# Patient Record
Sex: MALE | Race: WHITE
[De-identification: names, ages, dates, MRNs, and addresses within clinical notes are randomized per-mention and may not be internally consistent; named-entity substitution may affect disease eponyms.]

---

## 2021-11-02 ENCOUNTER — HOSPITAL ENCOUNTER (EMERGENCY)
Dept: HOSPITAL 33 - ED | Age: 56
Discharge: HOME | End: 2021-11-02
Payer: COMMERCIAL

## 2021-11-02 VITALS — OXYGEN SATURATION: 97 % | HEART RATE: 79 BPM | DIASTOLIC BLOOD PRESSURE: 82 MMHG | SYSTOLIC BLOOD PRESSURE: 131 MMHG

## 2021-11-02 DIAGNOSIS — S61.411A: Primary | ICD-10-CM
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
## 2021-11-02 PROCEDURE — 90471 IMMUNIZATION ADMIN: CPT

## 2021-11-02 PROCEDURE — 90715 TDAP VACCINE 7 YRS/> IM: CPT

## 2021-11-02 PROCEDURE — 12001 RPR S/N/AX/GEN/TRNK 2.5CM/<: CPT

## 2021-11-02 PROCEDURE — 99283 EMERGENCY DEPT VISIT LOW MDM: CPT

## 2021-11-02 NOTE — ERPHSYRPT
- History of Present Illness


Time Seen by Provider: 11/02/21 12:52


Source: patient


Exam Limitations: no limitations


Physician History: 





This is a right-handed white male patient of Dr. Ronaldo Sousa who was working

on car vehicle when he suffered a superficial laceration to the dorsal aspect of

his right hand.  Patient's tetanus status is not up-to-date.


Occurred: just prior to arrival


Method of Injury: other


Severity of Pain-Max: none


Severity of Pain-Current: none


Extremities Pain Location: hand: right (Dorsal aspect)


Modifying Factors: Improves With: nothing


Associated Symptoms: none


Allergies/Adverse Reactions: 








No Known Drug Allergies Allergy (Verified 11/02/21 12:59)


   





Home Medications: 








Fluoxetine HCl [Fluoxetine Dr] 20 mg PO DAILY 11/02/21 [History]


LORazepam [Lorazepam] 2 mg PO DAILY 11/02/21 [History]


Mirtazapine 30 mg*** [Remeron 30 mg***] 30 mg PO DAILY 11/02/21 [History]


Vilazodone HCl [Viibryd] 1 each PO DAILY 11/02/21 [History]








Travel Risk





- International Travel


Have you traveled outside of the country in past 3 weeks: No





- Coronavirus Screening


Are you exhibiting any of the following symptoms?: No


Close contact with a COVID-19 positive Pt in past 14-21 Days: No





- Review of Systems


Constitutional: No Symptoms


Eyes: No Symptoms


Ears, Nose, & Throat: No Symptoms


Respiratory: No Symptoms


Cardiac: No Symptoms


Abdominal/Gastrointestinal: No Symptoms


Genitourinary Symptoms: No Symptoms


Musculoskeletal: Injury (Dorsal aspect right hand)


Skin: Other (Superficial stellate laceration dorsal aspect right hand)


Neurological: No Symptoms


Psychological: No Symptoms


Endocrine: No Symptoms


Hematologic/Lymphatic: No Symptoms


Immunological/Allergic: No Symptoms





- Past Medical History


Pertinent Past Medical History: Yes





- Past Surgical History


Past Surgical History: Yes





- Nursing Vital Signs


Nursing Vital Signs: 


                               Initial Vital Signs











Temperature  97.8 F   11/02/21 12:52


 


Pulse Rate  79   11/02/21 12:52


 


Respiratory Rate  18   11/02/21 12:52


 


Blood Pressure  131/82   11/02/21 12:52


 


O2 Sat by Pulse Oximetry  97   11/02/21 12:52








                                   Pain Scale











Pain Intensity                 0

















- Physical Exam


General Appearance: no apparent distress, alert


Eyes, Ears, Nose, Throat Exam: normal ENT inspection, moist mucous membranes


Neck Exam: normal inspection, non-tender, supple, full range of motion


Cardiovascular/Respiratory Exam: chest non-tender, no respiratory distress


Abdominal Exam: non-tender


Back Exam: normal inspection, normal range of motion, No CVA tenderness, No 

vertebral tenderness


Shoulder Exam: normal inspection, non-tender, no evidence of injury, normal ROM


Elbow/Forearm Exam: normal inspection, non-tender, no evidence of injury, normal

 ROM


Wrist Exam: normal inspection, non-tender, no evidence of injury, normal ROM


Hand Exam: non-tender, normal ROM, laceration (To centimeter stellate, 

superficial laceration dorsal aspect right hand.  No active bleeding.)


Neuro/Tendon Exam: normal sensation, normal motor functions, normal tendon 

functions, no evidence tendon injury


Mental Status Exam: alert, oriented x 3, cooperative


Skin Exam: laceration (Laceration dorsal aspect right hand.  See above)


**SpO2 Interpretation**: normal


O2 Delivery: Room Air





Procedures





- Laceration/Wound Repair


  ** Right Dorsal Hand


Time of Procedure: 13:15


Wound Location: Right, hand (Dorsal aspect)


Wound Length (cm): 2


Wound's Depth, Shape: superficial, stellate


Wound Explored: clean (In bloodless field to the base no foreign body noted)


Irrigated: Yes


Hibiclens Prep: Yes


Wound Repaired With: Steri-strips, Dermabond (And benzoin was applied)


Sterile Dressing Applied?: Yes


Progress: 





11/02/21 13:24


The entire right hand was soaked in Hibiclens saline solution.  I then scrubbed 

the wound out aggressively with the same type of solution.  There was no 

bleeding.  I was unable to open the laceration to any significance.  There were 

no flaps present.  Therefore we covered wound with benzoin solution followed by 

Dermabond and 1/2 inch Steri-Strips.  We then placed a pressure dressing.  There

 are no complications based on procedure well.





- Course


Nursing assessment & vital signs reviewed: Yes


Ordered Tests: 


                               Active Orders 24 hr











 Category Date Time Status


 


 Wound Care STAT Care  11/02/21 13:19 Ordered








Medication Summary














Discontinued Medications














Generic Name Dose Route Start Last Admin





  Trade Name Freq  PRN Reason Stop Dose Admin


 


Diphtheria/Tetanus/Acell Pertussis  0.5 ml  11/02/21 13:16 





  Tdap --Diph,Pertuss(Acell),Tet Vac/Pf 0.5 Ml Vial  IM  11/02/21 13:17 





  .ONCE ONE  














- Progress


Progress: improved


Counseled pt/family regarding: diagnosis





- Departure


Departure Disposition: Home


Clinical Impression: 


 Laceration of right hand





Condition: Stable


Critical Care Time: No


Referrals: 


NGA MENDOZA [Primary Care Provider] - Follow Up with PCP


Additional Instructions: 


Keep current dressing in place until tomorrow evening.  Tomorrow evening, may 

remove the top dressings leaving the Steri-Strips in place.  At that time you 

may wash the area with soap and water.  Keep the Steri-Strips in place until 

they fall off in approximately 7 days.  Trim the Steri-Strips as they curl up.  

Use Tylenol and ibuprofen for pain control if there are no contraindications.

## 2022-09-23 ENCOUNTER — HOSPITAL ENCOUNTER (EMERGENCY)
Dept: HOSPITAL 33 - ED | Age: 57
Discharge: LEFT BEFORE DISCHARGE | End: 2022-09-23
Payer: COMMERCIAL

## 2022-09-23 VITALS — DIASTOLIC BLOOD PRESSURE: 82 MMHG | SYSTOLIC BLOOD PRESSURE: 122 MMHG | OXYGEN SATURATION: 97 % | HEART RATE: 66 BPM

## 2022-09-23 DIAGNOSIS — Z28.310: ICD-10-CM

## 2022-09-23 DIAGNOSIS — R41.0: Primary | ICD-10-CM

## 2022-09-23 DIAGNOSIS — R47.81: ICD-10-CM

## 2022-09-23 DIAGNOSIS — Z79.899: ICD-10-CM

## 2022-09-23 LAB
ALBUMIN SERPL-MCNC: 4.3 G/DL (ref 3.5–5)
ALP SERPL-CCNC: 62 U/L (ref 38–126)
ALT SERPL-CCNC: 18 U/L (ref 0–50)
AMPHETAMINES UR QL: POSITIVE
ANION GAP SERPL CALC-SCNC: 10.3 MEQ/L (ref 5–15)
AST SERPL QL: 22 U/L (ref 17–59)
BARBITURATES UR QL: NEGATIVE
BASOPHILS # BLD AUTO: 0.05 X10^3/UL (ref 0–0.4)
BENZODIAZ UR QL SCN: NEGATIVE
BILIRUB BLD-MCNC: 0.5 MG/DL (ref 0.2–1.3)
BNP SERPL-MCNC: 41.9 PG/ML (ref 0–900)
BUN SERPL-MCNC: 9 MG/DL (ref 9–20)
CALCIUM SPEC-MCNC: 9.2 MG/DL (ref 8.4–10.2)
CHLORIDE SERPL-SCNC: 105 MMOL/L (ref 98–107)
CO2 SERPL-SCNC: 26 MMOL/L (ref 22–30)
COCAINE UR QL SCN: NEGATIVE
CREAT SERPL-MCNC: 1.04 MG/DL (ref 0.66–1.25)
EOSINOPHIL # BLD AUTO: 0.11 X10^3/UL (ref 0–0.5)
GFR SERPLBLD BASED ON 1.73 SQ M-ARVRAT: > 60 ML/MIN
GLUCOSE SERPL-MCNC: 111 MG/DL (ref 74–106)
GLUCOSE UR-MCNC: NEGATIVE MG/DL
HCT VFR BLD AUTO: 40.7 % (ref 42–50)
HGB BLD-MCNC: 12.7 G/DL (ref 12.5–18)
LYMPHOCYTES # SPEC AUTO: 1.32 X10^3/UL (ref 1–4.6)
MAGNESIUM SERPL-MCNC: 2 MG/DL (ref 1.6–2.3)
MCH RBC QN AUTO: 29.7 PG (ref 26–32)
MCHC RBC AUTO-ENTMCNC: 31.2 G/DL (ref 32–36)
METHADONE UR QL: NEGATIVE
MONOCYTES # BLD AUTO: 0.5 X10^3/UL (ref 0–1.3)
OPIATES UR QL: NEGATIVE
PCP UR QL CFM>20 NG/ML: NEGATIVE
PLATELET # BLD AUTO: 287 X10^3/UL (ref 150–450)
POTASSIUM SERPLBLD-SCNC: 3.3 MMOL/L (ref 3.5–5.1)
PROT SERPL-MCNC: 7.2 G/DL (ref 6.3–8.2)
PROT UR STRIP-MCNC: NEGATIVE MG/DL
RBC # BLD AUTO: 4.27 X10^6/UL (ref 4.1–5.6)
RBC # UR AUTO: (no result) ERY/UL (ref 0–5)
RBC #/AREA URNS HPF: (no result) /HPF (ref 0–2)
SODIUM SERPL-SCNC: 138 MMOL/L (ref 137–145)
THC UR QL SCN: NEGATIVE
UA DIPSTICK PNL UR: (no result)
URINE CULTURED INDICATED?: NO
WBC # BLD AUTO: 7.7 X10^3/UL (ref 4–10.5)
WBC #/AREA URNS HPF: (no result) /HPF (ref 0–5)

## 2022-09-23 PROCEDURE — 83735 ASSAY OF MAGNESIUM: CPT

## 2022-09-23 PROCEDURE — 70498 CT ANGIOGRAPHY NECK: CPT

## 2022-09-23 PROCEDURE — 93005 ELECTROCARDIOGRAM TRACING: CPT

## 2022-09-23 PROCEDURE — 80164 ASSAY DIPROPYLACETIC ACD TOT: CPT

## 2022-09-23 PROCEDURE — 80053 COMPREHEN METABOLIC PANEL: CPT

## 2022-09-23 PROCEDURE — 81015 MICROSCOPIC EXAM OF URINE: CPT

## 2022-09-23 PROCEDURE — 80307 DRUG TEST PRSMV CHEM ANLYZR: CPT

## 2022-09-23 PROCEDURE — 70450 CT HEAD/BRAIN W/O DYE: CPT

## 2022-09-23 PROCEDURE — 99284 EMERGENCY DEPT VISIT MOD MDM: CPT

## 2022-09-23 PROCEDURE — 83605 ASSAY OF LACTIC ACID: CPT

## 2022-09-23 PROCEDURE — 36000 PLACE NEEDLE IN VEIN: CPT

## 2022-09-23 PROCEDURE — 84484 ASSAY OF TROPONIN QUANT: CPT

## 2022-09-23 PROCEDURE — 71045 X-RAY EXAM CHEST 1 VIEW: CPT

## 2022-09-23 PROCEDURE — 36415 COLL VENOUS BLD VENIPUNCTURE: CPT

## 2022-09-23 PROCEDURE — 70496 CT ANGIOGRAPHY HEAD: CPT

## 2022-09-23 PROCEDURE — 85025 COMPLETE CBC W/AUTO DIFF WBC: CPT

## 2022-09-23 PROCEDURE — 83880 ASSAY OF NATRIURETIC PEPTIDE: CPT

## 2022-09-23 NOTE — XRAY
Indication: Stroke.



Comparison: None



Portable chest demonstrates normal heart and lungs.  Bony thorax intact with

degenerative changes.

## 2022-09-23 NOTE — XRAY
Indication: Slurred speech and blurred vision.



Conventional contrast enhanced CTA head performed using 100 cc Isovue 370

contrast.  Two-dimensional sagittal and coronal reformatted images obtained.

Additional 3-dimensional reformatted images obtained using a separate

workstation.



Comparison: None



Distal internal carotid arteries are bilaterally symmetric without critical

stenosis, obstruction, or AV malformation.  Normal carotid terminus with

normal branching A1 and M1 segments bilaterally.  More distal left/right

anterior cerebral, left/right middle cerebral, and anterior communicating

arteries are normal in CTA appearance.  Incidental anatomic variant for fetal

origin of the left posterior cerebral artery.



Posterior circulation demonstrates normal CTA appearance to the basilar,

left/right posterior cerebral, and left/right superior cerebellar arteries.



Venous drainage/sinuses are unremarkable.  Brain parenchyma is negative for

abnormal enhancing intra or extra-axial mass.



Impression: Normal CTA head with contrast exam.

## 2022-09-23 NOTE — XRAY
Indication: Slurred speech and blurred vision.



Conventional contrast enhanced CTA neck performed using 100 cc Isovue 370

contrast.  Two-dimensional sagittal and coronal reformatted images obtained.

Additional 3-dimensional reformatted images obtained using a separate

workstation.



Comparison: None



Visualized aortic arch is normal in course and caliber with widely patent

branch right brachiocephalic, left common carotid, and left subclavian

arteries.



The common carotid, carotid bulb, internal carotid, and external carotid

arteries are bilaterally normal in CTA appearance.



Vertebral arteries are also normal in CTA appearance bilaterally with the

right slightly larger in caliber.



Visualized soft tissues demonstrate homogeneous enhancement of the thyroid

gland.  Supra and infraglottic airway widely patent.  A few subcentimeter

cervical and submandibular lymph nodes bilaterally, none pathologically

enlarged.  Visualized osseous structures intact.  Patient is edentulous.

Minimal/mild C3-C6 degenerative changes.  Lung apices are clear.



Impression: Normal CTA neck with contrast exam.  Incidental C3-C7 degenerative

changes.

## 2022-09-23 NOTE — ERPHSYRPT
- History of Present Illness


Time Seen by Provider: 09/23/22 19:09


Source: patient


Exam Limitations: no limitations


Patient Subjective Stated Complaint: C/O slurred speech this morning. Patient 

has some "lost time" today. Wife indicates he is being forgetful today; didn't 

remember speaking to certain people today or driving the car. Wife indicates he 

was fine/normal at 0620 today when she left for work and that patient called her

at approx 0815 with slurred speech.


Triage Nursing Assessment: Patient is alert and oriented but did state this as 

the month of October when asked what the month is. He did give the correct date 

of the 23rd. Attempted to complete NIHSS scale upon patient arrival but patient 

taken to CT before test completed. Able to complete LOC commands without 

difficulties, completed BUE and BLE motor function with no drift, and sensory 

noted to be normal before going to CT. Noted tremors to bilateral hands that are

worse in the right; wife and patient indicates that this is normal for patient 

and not new.


Physician History: 





57-year-old male with history of OCD, bipolar disorder, history of stroke with 

some left visual impairment presented in the ER with wife with chief complaint 

of confusion and losing track of time since morning.  As per report patient got 

confused between 6:30 AM-8:15 AM when he tried to back his truck and realized 

that tree trunks were moving despite having no wend and called his wife.  He did

not go to work and talk to his daughter twice.  When wife came back from work 

this evening she noticed he had slurring of speech which is much improved since 

then and is almost normal now.  Part wife patient has OCD and has reputation of 

certain actions daily which she is not doing today which means there is 

something wrong with him.  Also when wife asked about earlier this morning he 

had no recollection of any event/calling wife/daughter.  He denies any focal 

numbness tingling or weakness.  Denies any visual disturbance now but earlier 

reported some black spots bilaterally.  No chest pain palpitations or shortness 

of breath.  Denies use of alcohol or drugs.  


Wife later did report that patient has not been taking his Adderall for the last

few days as he ran out.


Timing/Duration: today, sudden


Severity: moderate


Character of Deficits: impaired speech


Baseline/Normal Cognition: alert oriented x 3


Current Cognition: alert oriented x 3


Associated Symptoms: denies symptoms


Allergies/Adverse Reactions: 








No Known Drug Allergies Allergy (Verified 09/23/22 19:04)


   





Home Medications: 








ALPRAZolam 1 MG*** [Xanax 1 mg***] 1 mg PO BID 10/25/14 [History]


Bupropion HCl Xl 150 mg*** [Wellbutrin  MG***] 150 mg PO DAILY 10/25/14 

[History]


Dextroamphetamine/Amphetamine [Adderall 10 mg Tablet] 10 mg PO BID 10/25/14 

[History]


Fluoxetine HCl [Fluoxetine Dr] 20 mg PO DAILY 11/02/21 [History]


LORazepam [Lorazepam] 2 mg PO DAILY 11/02/21 [History]


Mirtazapine 30 mg*** [Remeron 30 mg***] 30 mg PO DAILY 11/02/21 [History]


Vilazodone HCl [Viibryd] 1 each PO DAILY 11/02/21 [History]





Hx Tetanus, Diphtheria Vaccination/Date Given: Yes


Hx Influenza Vaccination/Date Given: No


Hx Pneumococcal Vaccination/Date Given: No


Immunizations Up to Date: Yes





Travel Risk





- International Travel


Have you traveled outside of the country in past 3 weeks: No





- Coronavirus Screening


Are you exhibiting any of the following symptoms?: No


Close contact with a COVID-19 positive Pt in past 14-21 Days: No





- Vaccine Status


Have you recieved a Covid-19 vaccination: No





- Review of Systems


Constitutional: No Symptoms


Eyes: No Symptoms


Ears, Nose, & Throat: No Symptoms


Respiratory: No Symptoms


Cardiac: No Symptoms


Abdominal/Gastrointestinal: No Symptoms


Genitourinary Symptoms: No Symptoms


Musculoskeletal: No Symptoms


Skin: No Symptoms


Neurological: Speech Changes


Psychological: Depression


Endocrine: No Symptoms


Hematologic/Lymphatic: No Symptoms


Immunological/Allergic: No Symptoms





- Past Medical History


Pertinent Past Medical History: Yes


Psycho-Social History: Depression, Anxiety





- Past Surgical History


Past Surgical History: No





- Social History


Smoking Status: Former smoker


Exposure to second hand smoke: No


Drug Use: none


Patient Lives Alone: No





- Nursing Vital Signs


Nursing Vital Signs: 


                               Initial Vital Signs











Temperature  97.8 F   09/23/22 19:06


 


Pulse Rate  90   09/23/22 19:06


 


Respiratory Rate  18   09/23/22 19:06


 


Blood Pressure  125/86   09/23/22 19:06


 


O2 Sat by Pulse Oximetry  97   09/23/22 19:06








                                   Pain Scale











Pain Intensity                 0

















- Jeff Coma Scale


Best Eye Response (Galt): (4) open spontaneously


Best Verbal Response (Jeff): (5) oriented


Best Motor Response (Jeff): (6) obeys commands


Galt Total: 15





- Physical Exam


General Appearance: no apparent distress, alert


Eye Exam: bilateral eye: normal inspection, PERRL, EOMI


Ears, Nose, Throat Exam: normal ENT inspection, TMs normal, pharynx normal, 

moist mucous membranes


Neck Exam: normal inspection, non-tender, supple, full range of motion


Respiratory: normal breath sounds, lungs clear


Cardiovascular: regular rate/rhythm, normal heart sounds


Gastrointestinal: soft, normal bowel sounds, No tenderness


Back Exam: normal inspection, normal range of motion, CVA tenderness


Extremity Exam: normal inspection, normal range of motion, pelvis stable


Mental Status: alert, oriented x 3, cooperative, depressed affect


CNs Exam: normal hearing, normal speech, PERRL, No facial asymmetry, No facial 

droop


Coordination/Gait: normal finger to nose, normal cerebellar function


Motor/Sensory: no motor deficit, no sensory deficit, no pronator drift, negative

 Babinski's sign


DTR: bicep (R): 2+, bicep (L): 2+, knee (R): 2+, knee (L): 2+


Skin Exam: normal color


**SpO2 Interpretation**: normal


SpO2: 97


O2 Delivery: Room Air





- Course


EKG Interpreted by Me: RATE ([id number]), Sinus Rhythm, NORMAL AXIS, NORMAL INTERVALS, 

Non-specific ST Changes, Other (Nonspecific T wave changes)


Ordered Tests: 


                               Active Orders 24 hr











 Category Date Time Status


 


 Clean Catch Urine Specimen STAT Care  09/23/22 21:24 Active


 


 EKG-ER Only STAT Care  09/23/22 19:31 Active


 


 IV Insertion STAT Care  09/23/22 19:31 Active


 


 CHEST 1 VIEW (PORTABLE) Stat Exams  09/23/22 19:31 Completed


 


 CT ANGIOGRAPHY NECK [CT] Stat Exams  09/23/22 18:58 Completed


 


 CTA HEAD W AND/OR WO CONTRAST [CT] Stat Exams  09/23/22 18:58 Completed


 


 HEAD WITHOUT CONTRAST [CT] Stat Exams  09/23/22 19:00 Taken


 


 BNP [NT PRO BNP] Stat Lab  09/23/22 19:51 Completed


 


 CBC W DIFF Stat Lab  09/23/22 19:51 Completed


 


 CMP Stat Lab  09/23/22 19:51 Completed


 


 Lactic Acid Stat Lab  09/23/22 19:31 Completed


 


 MAG [MAGNESIUM] Stat Lab  09/23/22 19:51 Completed


 


 TROPONIN Q4H Lab  09/23/22 19:51 Completed


 


 TROPONIN Q4H Lab  09/23/22 23:45 Ordered


 


 TROPONIN Q4H Lab  09/24/22 03:45 Ordered


 


 UA W/RFX CULTURE Stat Lab  09/23/22 21:26 Completed


 


 Urine Triage Profile Stat Lab  09/23/22 21:26 Received








Medication Summary











Generic Name Dose Route Start Last Admin





  Trade Name Freq  PRN Reason Stop Dose Admin


 


Sodium Chloride  1,000 mls @ 100 mls/hr  09/23/22 19:45  09/23/22 19:52





  Sodium Chloride 0.9% 1000 Ml  IV  10/23/22 19:44  100 mls/hr





  .Q10H FLAKITA   Administration














Discontinued Medications














Generic Name Dose Route Start Last Admin





  Trade Name Freq  PRN Reason Stop Dose Admin


 


Aspirin  324 mg  09/23/22 21:20  09/23/22 22:01





  Aspirin 81 Mg Tab.Chew  PO  09/23/22 21:21  324 mg





  STAT ONE   Administration











Lab/Rad Data: 


                           Laboratory Result Diagrams





                                 09/23/22 19:51 





                                 09/23/22 19:51 





                               Laboratory Results











  09/23/22 09/23/22 09/23/22 Range/Units





  21:26 21:26 19:51 


 


WBC     (4.0-10.5)  x10^3/uL


 


RBC     (4.1-5.6)  x10^6/uL


 


Hgb     (12.5-18.0)  g/dL


 


Hct     (42-50)  %


 


MCV     ()  fL


 


MCH     (26-32)  pg


 


MCHC     (32-36)  g/dL


 


RDW     (11.5-14.0)  %


 


Plt Count     (150-450)  x10^3/uL


 


MPV     (7.5-11.0)  fL


 


Gran %     (36.0-66.0)  %


 


Immature Gran % (Auto)     (0.00-0.4)  %


 


Nucleat RBC Rel Count     (0.00-0.1)  %


 


Eos # (Auto)     (0-0.5)  x10^3/uL


 


Immature Gran # (Auto)     (0.00-0.03)  x10^3u/L


 


Absolute Lymphs (auto)     (1.0-4.6)  x10^3/uL


 


Absolute Monos (auto)     (0.0-1.3)  x10^3/uL


 


Absolute Nucleated RBC     (0.00-0.01)  x10^3u/L


 


Lymphocytes %     (24.0-44.0)  %


 


Monocytes %     (0.0-12.0)  %


 


Eosinophils %     (0.00-5.0)  %


 


Basophils %     (0.0-0.4)  %


 


Absolute Granulocytes     (1.4-6.9)  x10^3/uL


 


Basophils #     (0-0.4)  x10^3/uL


 


Sodium     (137-145)  mmol/L


 


Potassium     (3.5-5.1)  mmol/L


 


Chloride     ()  mmol/L


 


Carbon Dioxide     (22-30)  mmol/L


 


Anion Gap     (5-15)  MEQ/L


 


BUN     (9-20)  mg/dL


 


Creatinine     (0.66-1.25)  mg/dL


 


Estimated GFR     ML/MIN


 


Glucose     ()  mg/dL


 


Lactic Acid     (0.4-2.0)  


 


Calcium     (8.4-10.2)  mg/dL


 


Magnesium     (1.6-2.3)  mg/dL


 


Total Bilirubin     (0.2-1.3)  mg/dL


 


AST     (17-59)  U/L


 


ALT     (0-50)  U/L


 


Alkaline Phosphatase     ()  U/L


 


Troponin I    < 0.012  (0.000-0.034)  ng/mL


 


NT-Pro-B Natriuret Pep     (0-900)  pg/mL


 


Serum Total Protein     (6.3-8.2)  g/dL


 


Albumin     (3.5-5.0)  g/dL


 


Urinalys Dipstick Clnc   MAIN LAB   


 


Urine Color   YELLOW   (YELLOW)  


 


Urine Appearance   CLEAR   (CLEAR)  


 


Urine pH   6.5   (5-6)  


 


Ur Specific Gravity   1.010   (1.005-1.025)  


 


POC Urine Protein Conf   NEGATIVE   (Negative)  


 


Urine Ketones   NEGATIVE   (NEGATIVE)  


 


Urine Nitrite   NEGATIVE   (NEGATIVE)  


 


Urine Bilirubin   NEGATIVE   (NEGATIVE)  


 


Urine Urobilinogen   0.2   (0-1)  mg/dL


 


Urine Leukocytes   TRACE   (NEGATIVE)  


 


Urine WBC (Auto)   3-5   (0-5)  /HPF


 


Urine RBC (Auto)   NONE   (0-2)  /HPF


 


U Epithel Cells (Auto)   NONE   (FEW)  /HPF


 


Urine Bacteria (Auto)   NONE   (NEGATIVE)  /HPF


 


Urine RBC   TRACE-INTACT   (0-5)  Reddy/ul


 


Urine Mucus (Auto)   SLIGHT   (NEGATIVE)  /HPF


 


Ur Culture Indicated?   NO   


 


Urine Glucose   NEGATIVE   (NEGATIVE)  mg/dL


 


Urine Opiates Level  NEGATIVE    (NEGATIVE)  


 


Ur Methadone  NEGATIVE    (NEGATIVE)  


 


Urine Barbiturates  NEGATIVE    (NEGATIVE)  


 


Ur Phencyclidine (PCP)  NEGATIVE    (NEGATIVE)  


 


Urine Amphetamine  POSITIVE    (NEGATIVE)  


 


U Benzodiazepine Level  NEGATIVE    (NEGATIVE)  


 


Urine Cocaine  NEGATIVE    (NEGATIVE)  


 


Urine Marijuana (THC)  NEGATIVE    (NEGATIVE)  














  09/23/22 09/23/22 09/23/22 Range/Units





  19:51 19:51 19:51 


 


WBC    7.7  (4.0-10.5)  x10^3/uL


 


RBC    4.27  (4.1-5.6)  x10^6/uL


 


Hgb    12.7  (12.5-18.0)  g/dL


 


Hct    40.7 L  (42-50)  %


 


MCV    95.3  ()  fL


 


MCH    29.7  (26-32)  pg


 


MCHC    31.2 L  (32-36)  g/dL


 


RDW    13.0  (11.5-14.0)  %


 


Plt Count    287  (150-450)  x10^3/uL


 


MPV    10.2  (7.5-11.0)  fL


 


Gran %    73.8 H  (36.0-66.0)  %


 


Immature Gran % (Auto)    0.3  (0.00-0.4)  %


 


Nucleat RBC Rel Count    0.0  (0.00-0.1)  %


 


Eos # (Auto)    0.11  (0-0.5)  x10^3/uL


 


Immature Gran # (Auto)    0.02  (0.00-0.03)  x10^3u/L


 


Absolute Lymphs (auto)    1.32  (1.0-4.6)  x10^3/uL


 


Absolute Monos (auto)    0.50  (0.0-1.3)  x10^3/uL


 


Absolute Nucleated RBC    0.00  (0.00-0.01)  x10^3u/L


 


Lymphocytes %    17.3 L  (24.0-44.0)  %


 


Monocytes %    6.5  (0.0-12.0)  %


 


Eosinophils %    1.4  (0.00-5.0)  %


 


Basophils %    0.7  (0.0-0.4)  %


 


Absolute Granulocytes    5.65  (1.4-6.9)  x10^3/uL


 


Basophils #    0.05  (0-0.4)  x10^3/uL


 


Sodium   138   (137-145)  mmol/L


 


Potassium   3.3 L   (3.5-5.1)  mmol/L


 


Chloride   105   ()  mmol/L


 


Carbon Dioxide   26   (22-30)  mmol/L


 


Anion Gap   10.3   (5-15)  MEQ/L


 


BUN   9   (9-20)  mg/dL


 


Creatinine   1.04   (0.66-1.25)  mg/dL


 


Estimated GFR   > 60.0   ML/MIN


 


Glucose   111 H   ()  mg/dL


 


Lactic Acid     (0.4-2.0)  


 


Calcium   9.2   (8.4-10.2)  mg/dL


 


Magnesium  2.0    (1.6-2.3)  mg/dL


 


Total Bilirubin   0.50   (0.2-1.3)  mg/dL


 


AST   22   (17-59)  U/L


 


ALT   18   (0-50)  U/L


 


Alkaline Phosphatase   62   ()  U/L


 


Troponin I     (0.000-0.034)  ng/mL


 


NT-Pro-B Natriuret Pep  41.9    (0-900)  pg/mL


 


Serum Total Protein   7.2   (6.3-8.2)  g/dL


 


Albumin   4.3   (3.5-5.0)  g/dL


 


Urinalys Dipstick Clnc     


 


Urine Color     (YELLOW)  


 


Urine Appearance     (CLEAR)  


 


Urine pH     (5-6)  


 


Ur Specific Gravity     (1.005-1.025)  


 


POC Urine Protein Conf     (Negative)  


 


Urine Ketones     (NEGATIVE)  


 


Urine Nitrite     (NEGATIVE)  


 


Urine Bilirubin     (NEGATIVE)  


 


Urine Urobilinogen     (0-1)  mg/dL


 


Urine Leukocytes     (NEGATIVE)  


 


Urine WBC (Auto)     (0-5)  /HPF


 


Urine RBC (Auto)     (0-2)  /HPF


 


U Epithel Cells (Auto)     (FEW)  /HPF


 


Urine Bacteria (Auto)     (NEGATIVE)  /HPF


 


Urine RBC     (0-5)  Reddy/ul


 


Urine Mucus (Auto)     (NEGATIVE)  /HPF


 


Ur Culture Indicated?     


 


Urine Glucose     (NEGATIVE)  mg/dL


 


Urine Opiates Level     (NEGATIVE)  


 


Ur Methadone     (NEGATIVE)  


 


Urine Barbiturates     (NEGATIVE)  


 


Ur Phencyclidine (PCP)     (NEGATIVE)  


 


Urine Amphetamine     (NEGATIVE)  


 


U Benzodiazepine Level     (NEGATIVE)  


 


Urine Cocaine     (NEGATIVE)  


 


Urine Marijuana (THC)     (NEGATIVE)  














  09/23/22 Range/Units





  19:31 


 


WBC   (4.0-10.5)  x10^3/uL


 


RBC   (4.1-5.6)  x10^6/uL


 


Hgb   (12.5-18.0)  g/dL


 


Hct   (42-50)  %


 


MCV   ()  fL


 


MCH   (26-32)  pg


 


MCHC   (32-36)  g/dL


 


RDW   (11.5-14.0)  %


 


Plt Count   (150-450)  x10^3/uL


 


MPV   (7.5-11.0)  fL


 


Gran %   (36.0-66.0)  %


 


Immature Gran % (Auto)   (0.00-0.4)  %


 


Nucleat RBC Rel Count   (0.00-0.1)  %


 


Eos # (Auto)   (0-0.5)  x10^3/uL


 


Immature Gran # (Auto)   (0.00-0.03)  x10^3u/L


 


Absolute Lymphs (auto)   (1.0-4.6)  x10^3/uL


 


Absolute Monos (auto)   (0.0-1.3)  x10^3/uL


 


Absolute Nucleated RBC   (0.00-0.01)  x10^3u/L


 


Lymphocytes %   (24.0-44.0)  %


 


Monocytes %   (0.0-12.0)  %


 


Eosinophils %   (0.00-5.0)  %


 


Basophils %   (0.0-0.4)  %


 


Absolute Granulocytes   (1.4-6.9)  x10^3/uL


 


Basophils #   (0-0.4)  x10^3/uL


 


Sodium   (137-145)  mmol/L


 


Potassium   (3.5-5.1)  mmol/L


 


Chloride   ()  mmol/L


 


Carbon Dioxide   (22-30)  mmol/L


 


Anion Gap   (5-15)  MEQ/L


 


BUN   (9-20)  mg/dL


 


Creatinine   (0.66-1.25)  mg/dL


 


Estimated GFR   ML/MIN


 


Glucose   ()  mg/dL


 


Lactic Acid  1.2  (0.4-2.0)  


 


Calcium   (8.4-10.2)  mg/dL


 


Magnesium   (1.6-2.3)  mg/dL


 


Total Bilirubin   (0.2-1.3)  mg/dL


 


AST   (17-59)  U/L


 


ALT   (0-50)  U/L


 


Alkaline Phosphatase   ()  U/L


 


Troponin I   (0.000-0.034)  ng/mL


 


NT-Pro-B Natriuret Pep   (0-900)  pg/mL


 


Serum Total Protein   (6.3-8.2)  g/dL


 


Albumin   (3.5-5.0)  g/dL


 


Urinalys Dipstick Clnc   


 


Urine Color   (YELLOW)  


 


Urine Appearance   (CLEAR)  


 


Urine pH   (5-6)  


 


Ur Specific Gravity   (1.005-1.025)  


 


POC Urine Protein Conf   (Negative)  


 


Urine Ketones   (NEGATIVE)  


 


Urine Nitrite   (NEGATIVE)  


 


Urine Bilirubin   (NEGATIVE)  


 


Urine Urobilinogen   (0-1)  mg/dL


 


Urine Leukocytes   (NEGATIVE)  


 


Urine WBC (Auto)   (0-5)  /HPF


 


Urine RBC (Auto)   (0-2)  /HPF


 


U Epithel Cells (Auto)   (FEW)  /HPF


 


Urine Bacteria (Auto)   (NEGATIVE)  /HPF


 


Urine RBC   (0-5)  Reddy/ul


 


Urine Mucus (Auto)   (NEGATIVE)  /HPF


 


Ur Culture Indicated?   


 


Urine Glucose   (NEGATIVE)  mg/dL


 


Urine Opiates Level   (NEGATIVE)  


 


Ur Methadone   (NEGATIVE)  


 


Urine Barbiturates   (NEGATIVE)  


 


Ur Phencyclidine (PCP)   (NEGATIVE)  


 


Urine Amphetamine   (NEGATIVE)  


 


U Benzodiazepine Level   (NEGATIVE)  


 


Urine Cocaine   (NEGATIVE)  


 


Urine Marijuana (THC)   (NEGATIVE)  














- Progress


Progress: unchanged, re-examined


Progress Note: 





09/23/22 22:15


Patient has a NIH of 1 currently.  Not a very good historian and history is 

obtained from wife.  Pain prompt CT and CTA head and neck which are essentially 

unremarkable for any acute findings.  Neuro consult is obtained who recommended 

giving patient aspirin and obtaining MRI for further evaluation.  Baseline work-

up grossly unremarkable.  No MRI services are available here, called [facility] and no beds are available.  I have [facility] after prolonged discussion for almost 40 minutes with royce levi and family and patient is excepted for transfer.  Later on patient decided

 not to go to any other facility and will follow-up with his primary care next 

week.  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 

leaving without full work-up which would not only involve delaying the 

diagnosis, worsening of condition including permanent disability and death which

 do seem understanding and decided to leave.  Patient walked out of the ER in 

stable condition.


Discussed with : Other (choi)


Counseled pt/family regarding: lab results, diagnosis, need for follow-up, rad 

results





- Departure


Departure Disposition: AMA


Clinical Impression: 


 Confusion, Stroke-like symptoms





Condition: Stable


Critical Care Time: No


Referrals: 


NGA MENDOZA [Primary Care Provider] - Follow Up with PCP/3 days


Instructions:  Delirium (Confusion) (DC)


Additional Instructions: 


Follow-up with primary care for reevaluation early Monday morning.  Return to ER

for worsening of symptoms like difficulty speech, numbness tingling weakness, 

confusion etc.  Continue with your current medications.

## 2022-09-26 NOTE — XRAY
Indication: Slurred speech.  Stroke.



Multiple contiguous axial images obtained through the head without contrast.



Comparison: April 3, 2010



Ventriculosulcal pattern appears symmetric.  New remote lacunar infarct right

basal ganglia.  No acute intracranial hemorrhage, abnormal extra-axial fluid

collection, or mass effect.  Fourth ventricle is midline without

hydrocephalus.  Gray-white matter differentiation preserved.  Bony calvarium

intact.  Visualized paranasal sinuses and mastoid air cells are clear.



Impression: Right basal ganglia remote lacunar infarct.  Remaining CT head

without contrast exam is negative.

## 2025-01-14 ENCOUNTER — HOSPITAL ENCOUNTER (EMERGENCY)
Dept: HOSPITAL 33 - ED | Age: 60
Discharge: ANOTHER HOSPITAL | End: 2025-01-14
Payer: COMMERCIAL

## 2025-01-14 VITALS — TEMPERATURE: 97.8 F

## 2025-01-14 VITALS
HEART RATE: 86 BPM | SYSTOLIC BLOOD PRESSURE: 142 MMHG | OXYGEN SATURATION: 96 % | DIASTOLIC BLOOD PRESSURE: 101 MMHG | RESPIRATION RATE: 16 BRPM

## 2025-01-14 DIAGNOSIS — Z79.899: ICD-10-CM

## 2025-01-14 DIAGNOSIS — I21.4: Primary | ICD-10-CM

## 2025-01-14 DIAGNOSIS — E78.5: ICD-10-CM

## 2025-01-14 DIAGNOSIS — I24.9: ICD-10-CM

## 2025-01-14 DIAGNOSIS — R07.9: ICD-10-CM

## 2025-01-14 LAB
ALBUMIN SERPL-MCNC: 4.6 G/DL (ref 3.5–5)
ALP SERPL-CCNC: 72 U/L (ref 38–126)
ALT SERPL-CCNC: 15 U/L (ref 0–50)
ANION GAP SERPL CALC-SCNC: 10.4 MEQ/L (ref 5–15)
APTT PPP: 26.8 SECONDS (ref 25.1–36.5)
AST SERPL QL: 27 U/L (ref 17–59)
BASOPHILS # BLD AUTO: 0.06 X10^3/UL (ref 0.01–0.08)
BASOPHILS NFR BLD AUTO: 0.9 % (ref 0.2–1.2)
BILIRUB BLD-MCNC: 0.4 MG/DL (ref 0.2–1.3)
BUN SERPL-MCNC: 14 MG/DL (ref 9–20)
CALCIUM SPEC-MCNC: 9.6 MG/DL (ref 8.4–10.2)
CHLORIDE SERPL-SCNC: 103 MMOL/L (ref 98–107)
CO2 SERPL-SCNC: 31 MMOL/L (ref 22–30)
CREAT SERPL-MCNC: 0.99 MG/DL (ref 0.66–1.25)
EOSINOPHIL # BLD AUTO: 0.16 X10^3/UL (ref 0.04–0.54)
GFR SERPLBLD BASED ON 1.73 SQ M-ARVRAT: 87.8 ML/MIN
GLUCOSE SERPL-MCNC: 94 MG/DL (ref 74–106)
HCT VFR BLD AUTO: 43.9 % (ref 40.1–51)
HGB BLD-MCNC: 14.1 G/DL (ref 13.7–17.5)
IMM GRANULOCYTES # BLD: 0.02 X10^3U/L (ref 0–0.03)
IMM GRANULOCYTES NFR BLD: 0.3 % (ref 0–0.43)
INR PPP: 0.93 (ref 0.8–3)
LYMPHOCYTES # SPEC AUTO: 2.98 X10^3/UL (ref 1.32–3.57)
MCH RBC QN AUTO: 28.4 PG (ref 25.7–32.2)
MCHC RBC AUTO-ENTMCNC: 32.1 G/DL (ref 32.3–36.5)
MONOCYTES # BLD AUTO: 0.68 X10^3/UL (ref 0.3–0.82)
NRBC # BLD AUTO: 0 X10^3U/L (ref 0–0.01)
NRBC BLD AUTO-RTO: 0 % (ref 0–0.2)
NT-PROBNP SERPL-MCNC: 261 PG/ML (ref ?–300)
PLATELET # BLD AUTO: 268 X10^3/UL (ref 163–337)
POTASSIUM SERPLBLD-SCNC: 4.5 MMOL/L (ref 3.5–5.1)
PROT SERPL-MCNC: 7.7 G/DL (ref 6.3–8.2)
PROTHROMBIN TIME: 10.2 SECONDS (ref 9.4–12.5)
RBC # BLD AUTO: 4.97 X10^6/UL (ref 4.63–6.08)
SODIUM SERPL-SCNC: 139 MMOL/L (ref 135–145)
WBC # BLD AUTO: 6.6 X10^3/UL (ref 4.23–9.07)

## 2025-01-14 PROCEDURE — 71045 X-RAY EXAM CHEST 1 VIEW: CPT

## 2025-01-14 PROCEDURE — 84484 ASSAY OF TROPONIN QUANT: CPT

## 2025-01-14 PROCEDURE — 96374 THER/PROPH/DIAG INJ IV PUSH: CPT

## 2025-01-14 PROCEDURE — 94760 N-INVAS EAR/PLS OXIMETRY 1: CPT

## 2025-01-14 PROCEDURE — 99285 EMERGENCY DEPT VISIT HI MDM: CPT

## 2025-01-14 PROCEDURE — 99292 CRITICAL CARE ADDL 30 MIN: CPT

## 2025-01-14 PROCEDURE — 85025 COMPLETE CBC W/AUTO DIFF WBC: CPT

## 2025-01-14 PROCEDURE — 36415 COLL VENOUS BLD VENIPUNCTURE: CPT

## 2025-01-14 PROCEDURE — 99291 CRITICAL CARE FIRST HOUR: CPT

## 2025-01-14 PROCEDURE — 85610 PROTHROMBIN TIME: CPT

## 2025-01-14 PROCEDURE — 93005 ELECTROCARDIOGRAM TRACING: CPT

## 2025-01-14 PROCEDURE — 83880 ASSAY OF NATRIURETIC PEPTIDE: CPT

## 2025-01-14 PROCEDURE — 80053 COMPREHEN METABOLIC PANEL: CPT

## 2025-01-14 PROCEDURE — 85730 THROMBOPLASTIN TIME PARTIAL: CPT

## 2025-01-14 RX ADMIN — HEPARIN SODIUM STA UNIT: 10000 INJECTION, SOLUTION INTRAVENOUS; SUBCUTANEOUS at 14:51

## 2025-01-14 RX ADMIN — NITROGLYCERIN PRN MLS/HR: 20 INJECTION INTRAVENOUS at 15:05

## 2025-01-14 RX ADMIN — HEPARIN SODIUM AND DEXTROSE SCH MLS/HR: 10000; 5 INJECTION INTRAVENOUS at 14:50

## 2025-01-14 RX ADMIN — ASPIRIN ONE MG: 81 TABLET, CHEWABLE ORAL at 14:50

## 2025-01-14 NOTE — ERPHSYRPT
- History of Present Illness


Time Seen by Provider: 01/14/25 13:12


Historian: patient


Exam Limitations: no limitations


Patient Subjective Stated Complaint: Pt states "I was getting a physical and 

mentioned that I was having a little chest pain and she sent me here to get c

reagand out."


Triage Nursing Assessment: Pt presented alert and oriented X 3, skin pwd. Pt 

ambulates with an upright steady gait, able to speak in clear full sentences. Pt

resting comfortably on the bed.


Physician History: 


Patient is a 59-year-old male history of hypercholesterolemia presents to our ED

as a referral from his primary care provider for evaluation of chest pain.  

Patient reports chest pain started on Friday 4 days ago.  Pain described as a 

substernal ache that radiated down to his left arm.  However patient did not 

feel it was necessary to obtain medical evaluation so did not follow-up in an 

emergency department until today.  Pain is primarily substernal at this time.  

No trauma no fever no nausea vomiting or diaphoresis.  Patient denies a history 

of the same.  Symptoms are mild to moderate in intensity.  No specific worsening

or improving factors.  Patient denies a cardiac history otherwise.  Wife at 

bedside.  They voiced no other complaints or concerns at this time.








Portions of this note were created with voice recognition technology.  There may

be grammatical, spelling, punctuation or sound alike errors





Timing/Duration: day(s) (4 days ago)


Activities at Onset: none


Quality: aching


Location: substernal


Chest Pain Radiation: arm


Severity of Pain-Max: moderate


Severity of Pain-Current: mild


Modifying Factors: Improves With: nothing


Associated Symptoms: denies symptoms


Prior Chest Pain/Cardiac Workup: no prior chest pain


Nitro Today/Relief: no nitro taken today


Aspirin Treatment Today: no aspirin today


Allergies/Adverse Reactions: 








No Known Drug Allergies Allergy (Verified 09/23/22 19:04)


   





Home Medications: 








Mirtazapine [Remeron] 45 mg PO DAILY 01/14/25 [History]


Vilazodone HCl 40 mg PO DAILY 01/14/25 [History]


lamoTRIgine [Lamotrigine] 100 mg PO DAILY 01/14/25 [History]





Hx Tetanus, Diphtheria Vaccination/Date Given: No


Hx Influenza Vaccination/Date Given: No


Hx Pneumococcal Vaccination/Date Given: No


Immunizations Up to Date: No





Travel Risk





- International Travel


Have you traveled outside of the country in past 3 weeks: No





- Emerging Infectious Disease


Are you exhibiting symptoms associated with any current EIDs: No





- Review of Systems


Constitutional: No Symptoms, No Fever, No Chills


Eyes: No Symptoms


Ears, Nose, & Throat: No Symptoms


Respiratory: No Symptoms, No Cough, No Dyspnea


Cardiac: No Symptoms, No Chest Pain, No Edema, No Syncope


Abdominal/Gastrointestinal: No Symptoms, No Abdominal Pain, No Nausea, No 

Vomiting, No Diarrhea


Genitourinary Symptoms: No Symptoms, No Dysuria


Musculoskeletal: No Symptoms, No Back Pain, No Neck Pain


Skin: No Symptoms, No Rash


Neurological: No Symptoms, No Dizziness, No Focal Weakness, No Sensory Changes


Psychological: No Symptoms


Endocrine: No Symptoms


Hematologic/Lymphatic: No Symptoms


Immunological/Allergic: No Symptoms


All Other Systems: Reviewed and Negative





- Past Medical History


Pertinent Past Medical History: Yes


Cardiac History: High Cholesterol


Psycho-Social History: Depression, Anxiety





- Past Surgical History


Past Surgical History: Yes


Other Surgical History: left eye





- Social History


Smoking Status: Former smoker


Exposure to second hand smoke: Yes


Drug Use: none


Patient Lives Alone: No





- Social Determinants of Health


Will the patient participate in the screening: Yes


Do you worry about a steady place to live?: No


Do you have any problems with any of the following?: No known problems


In the past 12 months,have you had to go without utilities?: No


Transportation Issues: No


Has anyone in your support network made you feel unsafe?: No


Have you or anyone in your house had to go without enough: No





- Nursing Vital Signs


Nursing Vital Signs: 


                               Initial Vital Signs











Temperature  97.8 F   01/14/25 13:11


 


Pulse Rate  83   01/14/25 13:11


 


Respiratory Rate  20   01/14/25 13:11


 


Blood Pressure  141/101   01/14/25 13:11


 


O2 Sat by Pulse Oximetry  98   01/14/25 13:11








                                   Pain Scale











Pain Intensity                 0

















- Physical Exam


General Appearance: no apparent distress, alert


Eye Exam: PERRL/EOMI, eyes nml inspection


Ears, Nose, Throat Exam: normal ENT inspection, moist mucous membranes


Neck Exam: normal inspection, non-tender, supple, full range of motion


Respiratory Exam: normal breath sounds, lungs clear, airway intact, No 

respiratory distress


Cardiovascular Exam: regular rate/rhythm, normal heart sounds, normal peripheral

 pulses


Gastrointestinal/Abdomen Exam: soft, No tenderness, No mass


Back Exam: normal inspection, No CVA tenderness, No vertebral tenderness


Extremity Exam: normal inspection, normal range of motion


Neurologic Exam: alert, oriented x 3, cooperative, normal mood/affect, sensation

 nml, No motor deficits


Skin Exam: normal color, warm, dry


Lymphatic Exam: No adenopathy


**SpO2 Interpretation**: normal


SpO2: 98


O2 Delivery: Room Air





- Course


Nursing assessment & vital signs reviewed: Yes


EKG Interpreted by Me: RATE ([id number]), Sinus Rhythm, NORMAL AXIS, NORMAL INTERVALS, 

NORMAL QRS





- Radiology Exams


  ** Chest


X-ray Interpretation: Teleradiologist Report (No acute findings)


Ordered Tests: 


                               Active Orders 24 hr











 Category Date Time Status


 


 Cath for Specimen-Straight STAT Care  01/14/25 13:18 Completed


 


 EKG-ER Only STAT Care  01/14/25 13:17 Active


 


 EKG-ER Only STAT Care  01/14/25 14:38 Active


 


 IV Insertion STAT Care  01/14/25 13:17 Active


 


 Pulse Oximetry (ED) STAT Care  01/14/25 13:17 Active


 


 CHEST 1 VIEW (PORTABLE) Stat Exams  01/14/25 13:18 Completed


 


 CBC Q48H Lab  01/15/25 06:00 Ordered


 


 CBC Q48H Lab  01/17/25 06:00 Ordered


 


 CBC Q48H Lab  01/19/25 06:00 Ordered


 


 CBC Q48H Lab  01/21/25 06:00 Ordered


 


 CBC Q48H Lab  01/23/25 06:00 Ordered


 


 CBC Q48H Lab  01/25/25 06:00 Ordered


 


 CBC Q48H Lab  01/27/25 06:00 Ordered


 


 CBC W DIFF Stat Lab  01/14/25 13:27 Completed


 


 CMP Stat Lab  01/14/25 13:27 Completed


 


 NT PRO BNPII Stat Lab  01/14/25 13:27 Completed


 


 PROTIME WITH INR Stat Lab  01/14/25 14:35 Completed


 


 PTT Q4H Lab  01/14/25 18:45 Ordered


 


 PTT Q4H Lab  01/14/25 22:45 Ordered


 


 PTT Q4H Lab  01/15/25 02:45 Ordered


 


 PTT Q4H Lab  01/15/25 06:45 Ordered


 


 PTT Q4H Lab  01/15/25 10:45 Ordered


 


 PTT Q4H Lab  01/15/25 14:45 Ordered


 


 PTT Q4H Lab  01/15/25 18:45 Ordered


 


 PTT Q4H Lab  01/15/25 22:45 Ordered


 


 PTT Q4H Lab  01/16/25 02:45 Ordered


 


 PTT Q4H Lab  01/16/25 06:45 Ordered


 


 PTT Q4H Lab  01/16/25 10:45 Ordered


 


 PTT Stat Lab  01/14/25 14:35 Completed


 


 TROPONIN Q4H Lab  01/14/25 13:27 Completed


 


 TROPONIN Q4H Lab  01/14/25 17:30 Ordered


 


 TROPONIN Q4H Lab  01/14/25 21:30 Ordered








Medication Summary











Generic Name Dose Route Start Last Admin





  Trade Name Fremakenna  PRN Reason Stop Dose Admin


 


Heparin Sodium/Dextrose  25,000 units in 250 mls @ 9.3 mls/hr  01/14/25 15:00  

01/14/25 14:50





  Heparin 25,000 Units/D5w: Use Order Set Renetta  IV  02/13/25 14:59  12 units/kg

/hr





  .Q24H FLAKITA   9.3 mls/hr





    Administration





  Protocol  





  12 UNITS/KG/HR  


 


Nitroglycerin/Dextrose  250 mls @ 1.5 mls/hr  01/14/25 14:36  01/14/25 15:05





  Ntg 0.2mg/Ml In D5w Glass***  IV  02/13/25 14:35  5 mcg/min





  .Q24H PRN   1.5 mls/hr





  CHEST PAIN   Administration





  Protocol  





  5 MCG/MIN  














Discontinued Medications














Generic Name Dose Route Start Last Admin





  Trade Name Margoth  PRN Reason Stop Dose Admin


 


Aspirin  324 mg  01/14/25 14:36  01/14/25 14:50





  Aspirin 81 Mg Tab.Chew  PO  01/14/25 14:37  324 mg





  STAT ONE   Administration


 


Aspirin  Confirm  01/14/25 14:49 





  Aspirin 81 Mg Tab.Chew  Administered  01/14/25 14:50 





  Dose  





  324 mg  





  .ROUTE  





  .STK-MED ONE  


 


Heparin Sodium (Beef Lung)  5,000 unit  01/14/25 14:34  01/14/25 14:51





  Heparin 5000 Units/0.5 Ml**** 5,000 Unit/0.5 Ml Syr  IV  01/14/25 14:35  5,000

 unit





  STAT STA   Administration


 


Heparin Sodium (Beef Lung)  Confirm  01/14/25 14:50 





  Heparin 5000 Units/0.5 Ml**** 5,000 Unit/0.5 Ml Syr  Administered  01/14/25 

14:51 





  Dose  





  5,000 unit  





  .ROUTE  





  .STK-MED ONE  











Lab/Rad Data: 


                           Laboratory Result Diagrams





                                 01/14/25 13:27 





                                 01/14/25 13:27 





                               Laboratory Results











  01/14/25 01/14/25 01/14/25 Range/Units





  14:35 13:27 13:27 


 


WBC     (4.23-9.07)  x10^3/uL


 


RBC     (4.63-6.08)  x10^6/uL


 


Hgb     (13.7-17.5)  g/dL


 


Hct     (40.1-51.0)  %


 


MCV     (79.0-92.2)  fL


 


MCH     (25.7-32.2)  pg


 


MCHC     (32.3-36.5)  g/dL


 


RDW     (11.6-14.4)  %


 


Plt Count     (163-337)  x10^3/uL


 


MPV     (9.4-12.4)  fL


 


Gran %     (34.0-67.9)  %


 


Immature Gran % (Auto)     (0.001-0.429)  %


 


Nucleat RBC Rel Count     (0.00-0.2)  %


 


Eos # (Auto)     (0.04-0.54)  x10^3/uL


 


Immature Gran # (Auto)     (0.001-0.031)  x10^3u/L


 


Absolute Lymphs (auto)     (1.32-3.57)  x10^3/uL


 


Absolute Monos (auto)     (0.30-0.82)  x10^3/uL


 


Absolute Nucleated RBC     (0.00-0.012)  x10^3u/L


 


Lymphocytes %     (21.8-53.1)  %


 


Monocytes %     (5.3-12.2)  %


 


Eosinophils %     (0.8-7.0)  %


 


Basophils %     (0.2-1.2)  %


 


Absolute Granulocytes     (1.78-5.38)  x10^3/uL


 


Basophils #     (0.01-0.08)  x10^3/uL


 


PT  10.2    (9.4-12.5)  SECONDS


 


INR  0.93    (0.8-3.0)  


 


APTT  26.8    (25.1-36.5)  SECONDS


 


Sodium    139  (135-145)  mmol/L


 


Potassium    4.5  (3.5-5.1)  mmol/L


 


Chloride    103  ()  mmol/L


 


Carbon Dioxide    31 H  (22-30)  mmol/L


 


Anion Gap    10.4  (5-15)  MEQ/L


 


BUN    14  (9-20)  mg/dL


 


Creatinine    0.99  (0.66-1.25)  mg/dL


 


Estimated GFR    87.8  ML/MIN


 


Glucose    94  ()  mg/dL


 


Calcium    9.6  (8.4-10.2)  mg/dL


 


Total Bilirubin    0.40  (0.2-1.3)  mg/dL


 


AST    27  (17-59)  U/L


 


ALT    15  (0-50)  U/L


 


Alkaline Phosphatase    72  ()  U/L


 


Troponin I   0.068 H*   (0.000-0.033)  ng/mL


 


NT-Pro-B Natriuret Pep    261  (<300)  pg/mL


 


Serum Total Protein    7.7  (6.3-8.2)  g/dL


 


Albumin    4.6  (3.5-5.0)  g/dL














  01/14/25 Range/Units





  13:27 


 


WBC  6.6  (4.23-9.07)  x10^3/uL


 


RBC  4.97  (4.63-6.08)  x10^6/uL


 


Hgb  14.1  (13.7-17.5)  g/dL


 


Hct  43.9  (40.1-51.0)  %


 


MCV  88.3  (79.0-92.2)  fL


 


MCH  28.4  (25.7-32.2)  pg


 


MCHC  32.1 L  (32.3-36.5)  g/dL


 


RDW  12.7  (11.6-14.4)  %


 


Plt Count  268  (163-337)  x10^3/uL


 


MPV  9.9  (9.4-12.4)  fL


 


Gran %  40.9  (34.0-67.9)  %


 


Immature Gran % (Auto)  0.3  (0.001-0.429)  %


 


Nucleat RBC Rel Count  0.0  (0.00-0.2)  %


 


Eos # (Auto)  0.16  (0.04-0.54)  x10^3/uL


 


Immature Gran # (Auto)  0.02  (0.001-0.031)  x10^3u/L


 


Absolute Lymphs (auto)  2.98  (1.32-3.57)  x10^3/uL


 


Absolute Monos (auto)  0.68  (0.30-0.82)  x10^3/uL


 


Absolute Nucleated RBC  0.00  (0.00-0.012)  x10^3u/L


 


Lymphocytes %  45.2  (21.8-53.1)  %


 


Monocytes %  10.3  (5.3-12.2)  %


 


Eosinophils %  2.4  (0.8-7.0)  %


 


Basophils %  0.9  (0.2-1.2)  %


 


Absolute Granulocytes  2.70  (1.78-5.38)  x10^3/uL


 


Basophils #  0.06  (0.01-0.08)  x10^3/uL


 


PT   (9.4-12.5)  SECONDS


 


INR   (0.8-3.0)  


 


APTT   (25.1-36.5)  SECONDS


 


Sodium   (135-145)  mmol/L


 


Potassium   (3.5-5.1)  mmol/L


 


Chloride   ()  mmol/L


 


Carbon Dioxide   (22-30)  mmol/L


 


Anion Gap   (5-15)  MEQ/L


 


BUN   (9-20)  mg/dL


 


Creatinine   (0.66-1.25)  mg/dL


 


Estimated GFR   ML/MIN


 


Glucose   ()  mg/dL


 


Calcium   (8.4-10.2)  mg/dL


 


Total Bilirubin   (0.2-1.3)  mg/dL


 


AST   (17-59)  U/L


 


ALT   (0-50)  U/L


 


Alkaline Phosphatase   ()  U/L


 


Troponin I   (0.000-0.033)  ng/mL


 


NT-Pro-B Natriuret Pep   (<300)  pg/mL


 


Serum Total Protein   (6.3-8.2)  g/dL


 


Albumin   (3.5-5.0)  g/dL














- Progress


Progress: improved


Air Movement: good


Progress Note: 


59-year-old male presents to our emergency department as a referral from his 

primary care provider for evaluation of chest pain.  Chest pain started 

approximately 4 days ago.  Chest pain substernal radiating down his left arm.  

Physical exam otherwise nonremarkable.  Chest x-ray showed no acute findings.  

EKG reveals some flattening of T waves laterally.  No STEMI.  Troponin elevated 

at 0.068.  EKG repeated.  No significant change.  Patient received heparin drip,

 nitro drip and aspirin.  Patient is resting comfortably at this time.  No 

active pain.  We contacted [facility] as patient's wife requested transfer 

to Dr. ELEANOR Holman.  However [facility] has a 2-day wait time.  We contacted 

[facility].  Patient was auto excepted by [facility] at 2:41 PM.  

Plan of care discussed with patient.  He agrees to transfer to [facility] 

for further evaluation and treatment.








Portions of this note were created with voice recognition technology.  There may

 be grammatical, spelling, punctuation or sound alike errors








Complexity of problem addressed is moderate acute complicated.  Critical care 

time is between is between 75 minutes in the 104 minutes.  Immediate 

intervention indicated to prevent further deterioration.  Complexity of data 

reviewed and analyzed is extensive.  Test ordered test reviewed.  Results 

analyzed and correlated clinically with history and physical exam.  Management 

discussed with [facility].  Patient auto excepted by Dr. Mcpherson at 2:41 

PM.  Risk of complication and or risk of morbidity/mortality of patient man

agement is high.  Patient requires hospitalization/transfer to higher level of 

care.  Vital stable.  Time spent to transfer patient is approximately 20 

minutes.  Plan of care established for shared decision making.  No social 

determinants of health present to impede follow-up.











Portions of this note were created with voice recognition technology.  There may

 be grammatical, spelling, punctuation or sound alike errors





01/14/25 14:47











Blood Culture(s) Obtained: No


Antibiotics given: No


Counseled pt/family regarding: lab results, diagnosis, rad results





- Departure


Departure Disposition: Transfer


Clinical Impression: 


 Chest pain, ACS (acute coronary syndrome), NSTEMI (non-ST elevated myocardial 

infarction)





Condition: Stable


Critical Care Time: Yes


Critical Care Time(excluding separately billable procedures): Critical  

mins


Referrals: 


SCOTT MANRIQUEZ NP [Primary Care Provider] - Follow up/PCP as directed

## 2025-01-14 NOTE — XRAY
Indication: Chest pain.



Comparison: December 14, 2022



Portable apical lordotic chest hyperinflated with now minimal right midlung

subsegmental atelectasis/scarring and tiny calcified granuloma.  No focal

infiltrate, consolidation, or large effusion.  Heart not enlarged.  Bony

thorax intact again with mild degenerative changes.



Impression: Nonacute hyperinflated chest with chronic features.